# Patient Record
(demographics unavailable — no encounter records)

---

## 2024-12-12 NOTE — HISTORY OF PRESENT ILLNESS
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  Since last visit, patient reports no new stroke-like symptoms. She is tolerating her Aspirin and Atorvastatin without bleeding, bruising or myalgias. ILR without episode of AF since implantation. Sleep study from 7/2023 with moderate SILKE.   BP: Diet: Exercise: Hematology: Sleep Medicine: Recent Labs: Headaches: ILR:

## 2024-12-12 NOTE — ASSESSMENT
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  Plan: -Continue Aspirin and Atorvastatin for secondary stroke prevention -Surgically cleared for upcoming ankle surgery -Continue aggressive vascular risk factor control with PCP including BP goal <130/80 and LDL goal <70 -Stroke labs today including Lipoprotein A and will repeat factor VIII assay -Referral to sleep medicine for moderate SILKE treatment -MRI head to r/o silent ischemia -Cardiac CT to confirm no PFO -Counselled on healthy eating (DASH/Mediterranean diet, limiting red meats and fried foods) -Counselled on importance of regular exercise and remaining active -Counselled on f/u with PCP regarding regular health maintenance and prevention, including routine screening -Counselled on signs of stroke BEFAST and to call 911 with any new or worsening neurological symptoms -RTO in 6 months with Dr. Gregory

## 2024-12-12 NOTE — PHYSICAL EXAM
[FreeTextEntry1] : Exam is limited due to the nature of the telehealth visit. The patient is alert and oriented to conversation. Speech and language are intact. Cranial nerves are grossly intact. The patient is able to move all extremities.